# Patient Record
Sex: FEMALE | Race: WHITE | NOT HISPANIC OR LATINO | URBAN - METROPOLITAN AREA
[De-identification: names, ages, dates, MRNs, and addresses within clinical notes are randomized per-mention and may not be internally consistent; named-entity substitution may affect disease eponyms.]

---

## 2023-01-17 ENCOUNTER — TELEMEDICINE (OUTPATIENT)
Dept: PSYCHIATRY | Facility: CLINIC | Age: 63
End: 2023-01-17

## 2023-01-17 DIAGNOSIS — F31.81 BIPOLAR II DISORDER (HCC): Primary | ICD-10-CM

## 2023-01-17 RX ORDER — APIXABAN 5 MG/1
5 TABLET, FILM COATED ORAL 2 TIMES DAILY
COMMUNITY
Start: 2023-01-01

## 2023-01-17 RX ORDER — RISPERIDONE 3 MG/1
3 TABLET ORAL DAILY
COMMUNITY
Start: 2022-12-31 | End: 2023-01-17 | Stop reason: SDUPTHER

## 2023-01-17 RX ORDER — DOXYCYCLINE HYCLATE 20 MG
20 TABLET ORAL DAILY
COMMUNITY
Start: 2023-01-01

## 2023-01-17 RX ORDER — LAMOTRIGINE 200 MG/1
200 TABLET ORAL DAILY
COMMUNITY
Start: 2023-01-01 | End: 2023-01-17 | Stop reason: SDUPTHER

## 2023-01-17 RX ORDER — LEVOTHYROXINE SODIUM 75 UG/1
TABLET ORAL
COMMUNITY
Start: 2023-01-12

## 2023-01-17 RX ORDER — SIMVASTATIN 40 MG
TABLET ORAL
COMMUNITY
Start: 2023-01-16

## 2023-01-17 RX ORDER — LAMOTRIGINE 200 MG/1
200 TABLET ORAL DAILY
Qty: 30 TABLET | Refills: 5 | Status: SHIPPED | OUTPATIENT
Start: 2023-01-17

## 2023-01-17 RX ORDER — RISPERIDONE 3 MG/1
3 TABLET ORAL DAILY
Qty: 30 TABLET | Refills: 5 | Status: SHIPPED | OUTPATIENT
Start: 2023-01-17

## 2023-01-17 NOTE — PSYCH
Virtual Regular Visit    Verification of patient location:    Patient is located in the following state in which I hold an active license [place]      Assessment/Plan:    Problem List Items Addressed This Visit    None  Visit Diagnoses     Bipolar II disorder ([address] )    -  Primary    Relevant Medications    risperiDONE (RisperDAL) 3 mg tablet    lamoTRIgine (LaMICtal) 200 MG tablet          Goals addressed in session: Goal 1          Reason for visit is   Chief Complaint   Patient presents with   • Medication Management        Encounter provider Eliane Ludwig MD    Provider located at [address] [facility]  [address]  [phone]      Recent Visits  No visits were found meeting these conditions  Showing recent visits within past 7 days and meeting all other requirements  Today's Visits  Date Type Provider Dept   01/17/23 Telemedicine Eliane Ludwig, 5 [facility] today's visits and meeting all other requirements  Future Appointments  No visits were found meeting these conditions  Showing future appointments within next 150 days and meeting all other requirements       The patient was identified by name and date of birth  Crispin Donald was informed that this is a telemedicine visit and that the visit is being conducted [employer]  She agrees to proceed     My office door was closed  No one else was in the room  She acknowledged consent and understanding of privacy and security of the video platform  The patient has agreed to participate and understands they can discontinue the visit at any time  Patient is aware this is a billable service  Subjective  Crispin Donald is a 57 y o  female with bipolar do presents for regular f/u      Compliant with meds, denies SE  Endocrinology w/u for hypothyroidism and pre DM - regular blood work  Pt works      HPI   Mood -reports as good and stable; good energy; social and active  Anxiety - denies    Past Medical History:   Diagnosis Date   • COPD (chronic obstructive pulmonary disease) ([facility] )    • Hypothyroidism    • PE (pulmonary thromboembolism) ([facility] )        History reviewed  No pertinent surgical history  Current Outpatient Medications   Medication Sig Dispense Refill   • lamoTRIgine (LaMICtal) 200 MG tablet Take 1 tablet (200 mg total) by mouth daily 30 tablet 5   • risperiDONE (RisperDAL) 3 mg tablet Take 1 tablet (3 mg total) by mouth daily 30 tablet 5   • doxycycline (PERIOSTAT) 20 MG tablet Take 20 mg by mouth daily     • Eliquis 5 MG Take 5 mg by mouth 2 (two) times a day     • simvastatin (ZOCOR) 40 mg tablet      • Unithroid 75 MCG tablet        No current facility-administered medications for this visit  Not on File    Review of Systems   Constitutional: Negative for activity change and appetite change  Endocrine:        Hypothyroidism   Psychiatric/Behavioral: Negative for dysphoric mood, sleep disturbance and suicidal ideas  The patient is not hyperactive  Video Exam    There were no vitals filed for this visit  Physical Exam  Constitutional:       Appearance: Normal appearance  She is normal weight  Neurological:      Mental Status: She is alert  Psychiatric:         Attention and Perception: Attention and perception normal          Mood and Affect: Mood and affect normal          Speech: Speech normal          Behavior: Behavior normal  Behavior is cooperative  Thought Content:  Thought content normal          Judgment: Judgment normal           Visit Time    Visit Start Time: 7 58 am   Visit Stop Time: 8 04 am   Total Visit Duration: 16 minutes

## 2023-07-17 ENCOUNTER — TELEMEDICINE (OUTPATIENT)
Dept: PSYCHIATRY | Facility: CLINIC | Age: 63
End: 2023-07-17
Payer: MEDICARE

## 2023-07-17 DIAGNOSIS — F31.81 BIPOLAR II DISORDER (HCC): Primary | ICD-10-CM

## 2023-07-17 PROCEDURE — 99213 OFFICE O/P EST LOW 20 MIN: CPT | Performed by: PSYCHIATRY & NEUROLOGY

## 2023-07-17 RX ORDER — RISPERIDONE 3 MG/1
3 TABLET ORAL DAILY
Qty: 30 TABLET | Refills: 5 | Status: SHIPPED | OUTPATIENT
Start: 2023-07-17

## 2023-07-17 RX ORDER — LEVOTHYROXINE SODIUM 100 MCG
100 TABLET ORAL DAILY
COMMUNITY
Start: 2023-07-13

## 2023-07-17 RX ORDER — LAMOTRIGINE 200 MG/1
200 TABLET ORAL DAILY
Qty: 30 TABLET | Refills: 5 | Status: SHIPPED | OUTPATIENT
Start: 2023-07-17

## 2023-07-17 NOTE — PSYCH
Virtual Regular Visit    Verification of patient location:    Patient is located at Home in the following state in which I hold an active license [place]      Assessment/Plan:    Problem List Items Addressed This Visit    None      Goals addressed in session: Goal 1          Reason for visit is   Chief Complaint   Patient presents with   • Medication Management        Encounter provider Summer Vergara MD    Provider located at [address] [place] [address],[address]  [address]  [phone]      Recent Visits  No visits were found meeting these conditions. Showing recent visits within past 7 days and meeting all other requirements  Today's Visits  Date Type Provider Dept   07/17/23 Telemedicine Summer Vergara, [address] today's visits and meeting all other requirements  Future Appointments  No visits were found meeting these conditions. Showing future appointments within next 150 days and meeting all other requirements       The patient was identified by name and date of birth. Shai Dasilva was informed that this is a telemedicine visit and that the visit is being conducted [employer]. She agrees to proceed. .  My office door was closed. No one else was in the room. She acknowledged consent and understanding of privacy and security of the video platform. The patient has agreed to participate and understands they can discontinue the visit at any time. Poor audio connection; phone was also used  Patient is aware this is a billable service. Subjective  Shai Dasilva is a 70 y.o. female with bipolar do presents for regular f/u   On lamictal and trispiral; denies SE. Pre DM - PCP monitors BGM  hypothyroidism - not well controlled - meds adjusted  Pt continues to work    HPI   Mood - reports as mostly good and stable; denies mood swings or depression.  Episodes of low energy ( thyroid?), but does ADLs, social, active  Anxiety - denies panic attacks or racing thoughts    Past Medical History:   Diagnosis Date   • COPD (chronic obstructive pulmonary disease) ([address])    • Hypothyroidism    • PE (pulmonary thromboembolism) ([address])        History reviewed. No pertinent surgical history. Current Outpatient Medications   Medication Sig Dispense Refill   • doxycycline (PERIOSTAT) 20 MG tablet Take 20 mg by mouth daily     • Eliquis 5 MG Take 5 mg by mouth 2 (two) times a day     • lamoTRIgine (LaMICtal) 200 MG tablet Take 1 tablet (200 mg total) by mouth daily 30 tablet 5   • risperiDONE (RisperDAL) 3 mg tablet Take 1 tablet (3 mg total) by mouth daily 30 tablet 5   • simvastatin (ZOCOR) 40 mg tablet      • Synthroid 100 MCG tablet Take 100 mcg by mouth daily     • Unithroid 75 MCG tablet        No current facility-administered medications for this visit. Not on File    Review of Systems   Constitutional: Negative for activity change and appetite change. Endocrine:        Hypothyroidism; pre DM   Psychiatric/Behavioral: Negative for dysphoric mood, sleep disturbance and suicidal ideas. The patient is not nervous/anxious. Video Exam    There were no vitals filed for this visit. Physical Exam  Constitutional:       Appearance: Normal appearance. She is normal weight. Neurological:      Mental Status: She is alert. Psychiatric:         Attention and Perception: Attention and perception normal.         Mood and Affect: Mood and affect normal.         Speech: Speech normal.         Behavior: Behavior normal. Behavior is cooperative. Thought Content:  Thought content normal.         Judgment: Judgment normal.          Visit Time    Visit Start Time: 7.58 am   Visit Stop Time: 8.06 am   Total Visit Duration: 15 minutes

## 2024-01-09 ENCOUNTER — TELEMEDICINE (OUTPATIENT)
Dept: PSYCHIATRY | Facility: CLINIC | Age: 64
End: 2024-01-09
Payer: MEDICARE

## 2024-01-09 DIAGNOSIS — F31.81 BIPOLAR II DISORDER (HCC): Primary | ICD-10-CM

## 2024-01-09 PROCEDURE — 99213 OFFICE O/P EST LOW 20 MIN: CPT | Performed by: PSYCHIATRY & NEUROLOGY

## 2024-01-09 RX ORDER — RISPERIDONE 3 MG/1
3 TABLET ORAL DAILY
Qty: 30 TABLET | Refills: 5 | Status: SHIPPED | OUTPATIENT
Start: 2024-01-09

## 2024-01-09 RX ORDER — LAMOTRIGINE 200 MG/1
200 TABLET ORAL DAILY
Qty: 30 TABLET | Refills: 5 | Status: SHIPPED | OUTPATIENT
Start: 2024-01-09

## 2024-01-09 NOTE — PSYCH
Virtual Regular Visit    Verification of patient location:    Patient is located at Home in the following state in which I hold an [place] license [place]      Assessment/Plan:    Problem List Items Addressed This Visit    None      Goals addressed in session: Goal 1          Reason for visit is   Chief Complaint   Patient presents with    Medication Management        Encounter provider Ct Patricio MD    Provider located at [facility]  [address] [id number] [phone]      Recent Visits  No visits were found meeting these conditions.  Showing recent visits within past 7 days and meeting all other requirements  Today's Visits  Date Type Provider Dept   01/09/24 Telemedicine Ct Patricio MD [facility]   Showing today's visits and meeting all other requirements  Future Appointments  No visits were found meeting these conditions.  Showing future appointments within next 150 days and meeting all other requirements       The patient was identified by name and date of birth. Stacy Dockery was informed that this is a telemedicine visit and that the visit is being conducted throughthe Epic Embedded platform. She agrees to proceed..  My office door was closed. No one else was in the room.  She acknowledged consent and understanding of privacy and security of the video platform. The patient has agreed to participate and understands they can discontinue the visit at any time.    Patient is aware this is a billable service.     Subjective  Stacy Dockery is a 63 y.o. female with bipolar do presents for regular f/u  .  Compliant with meds, denies SE  Blood work by PCP in October - WNL; thyroid - WNL, on meds  works      HPI   Mood - reports as good and stable; denies depression or mood swings. Good energy most of the time; social and active at baseline. Denies AH  Past Medical History:   Diagnosis Date    COPD (chronic obstructive  pulmonary disease) ([facility])     Hypothyroidism     PE (pulmonary thromboembolism) ([facility])        History reviewed. No pertinent surgical history.    Current Outpatient Medications   Medication Sig Dispense Refill    lamoTRIgine (LaMICtal) 200 MG tablet Take 1 tablet (200 mg total) by mouth daily 30 tablet 5    risperiDONE (RisperDAL) 3 mg tablet Take 1 tablet (3 mg total) by mouth daily 30 tablet 5    doxycycline (PERIOSTAT) 20 MG tablet Take 20 mg by mouth daily      Eliquis 5 MG Take 5 mg by mouth 2 (two) times a day      simvastatin (ZOCOR) 40 mg tablet       Synthroid 100 MCG tablet Take 100 mcg by mouth daily      Unithroid 75 MCG tablet        No current facility-administered medications for this visit.        Not on File    Review of Systems   Constitutional:  Negative for activity change and appetite change.   Psychiatric/Behavioral:  Negative for dysphoric mood, sleep disturbance and suicidal ideas. The patient is not nervous/anxious.        Video Exam    There were no vitals filed for this visit.    Physical Exam  Constitutional:       Appearance: Normal appearance.   Neurological:      Mental Status: She is alert.   Psychiatric:         Attention and Perception: Attention and perception normal.         Mood and Affect: Mood and affect normal.         Behavior: Behavior normal. Behavior is cooperative.         Thought Content: Thought content normal.         Judgment: Judgment normal.      Comments: Pt is not very talkative at baseline          Visit Time    Visit Start Time: 7.56 am   Visit Stop Time: 8.02 am   Total Visit Duration:  15 minutes    TREATMENT PLAN (Medication Management Only)        [facility]    Name and Date of Birth:  Stacy Dockery 63 y.o. 1960  Date of Treatment Plan: January 9, 2024  Diagnosis/Diagnoses:    1. Bipolar II disorder (HCC)      Strengths/Personal Resources for Self-Care: supportive family, taking medications as  prescribed.  Area/Areas of need (in own words): anxiety, mood swings  1. Long Term Goal: maintain mood stability.  Target Date:6 months - 7/9/2024  Person/Persons responsible for completion of goal: Stacy  2.  Short Term Objective (s) - How will we reach this goal?:   A. Provider new recommended medication/dosage changes and/or continue medication(s): continue current medications as prescribed Lamictal, Risperdal.  B. N/A.  C. N/A.  Target Date:6 months - 7/9/2024  Person/Persons Responsible for Completion of Goal: Stacy  Progress Towards Goals: stable  Treatment Modality: medication management every 6 month  Review due 180 days from date of this plan: 6 months - 7/9/2024  Expected length of service: maintenance  My Physician/PA/NP and I have developed this plan together and I agree to work on the goals and objectives. I understand the treatment goals that were developed for my treatment.

## 2024-06-24 ENCOUNTER — TELEMEDICINE (OUTPATIENT)
Dept: PSYCHIATRY | Facility: CLINIC | Age: 64
End: 2024-06-24
Payer: MEDICARE

## 2024-06-24 DIAGNOSIS — F31.81 BIPOLAR II DISORDER (HCC): ICD-10-CM

## 2024-06-24 PROCEDURE — 99214 OFFICE O/P EST MOD 30 MIN: CPT | Performed by: PSYCHIATRY & NEUROLOGY

## 2024-06-24 RX ORDER — RISPERIDONE 3 MG/1
3 TABLET ORAL DAILY
Qty: 30 TABLET | Refills: 5 | Status: SHIPPED | OUTPATIENT
Start: 2024-06-24

## 2024-06-24 RX ORDER — LAMOTRIGINE 200 MG/1
200 TABLET ORAL DAILY
Qty: 30 TABLET | Refills: 5 | Status: SHIPPED | OUTPATIENT
Start: 2024-06-24

## 2024-06-24 NOTE — PATIENT INSTRUCTIONS
Continue lamictal and risperdal on current doses  F/u with PCP re: vit D deficiency and blood work  Healthy diet and exercises d/w pt  Long term effects of chronic MJ use and risk of amotivational syndrome explained to the pt - recommended to stop

## 2024-06-24 NOTE — PSYCH
Virtual Regular Visit    Verification of patient location:    Patient is located at Home in the following state in which I hold an [place] license [place]      Assessment/Plan:    Problem List Items Addressed This Visit    None      Goals addressed in session: Goal 1          Reason for visit is   Chief Complaint   Patient presents with    Medication Management        Encounter provider Ct Patricio MD      Recent Visits  No visits were found meeting these conditions.  Showing recent visits within past 7 days and meeting all other requirements  Today's Visits  Date Type Provider Dept   06/24/24 Telemedicine Ct Patricio MD [facility]   Showing today's visits and meeting all other requirements  Future Appointments  No visits were found meeting these conditions.  Showing future appointments within next 150 days and meeting all other requirements       The patient was identified by name and date of birth. Stacy Dockery was informed that this is a telemedicine visit and that the visit is being conducted throughthe Epic Embedded platform. She agrees to proceed..  My office door was closed. No one else was in the room.  She acknowledged consent and understanding of privacy and security of the video platform. The patient has agreed to participate and understands they can discontinue the visit at any time.    Patient is aware this is a billable service.     Subjective  Stacy Dockery is a 64 y.o. female with bipolar do presents for regular f/u  .  Compliant with meds, SE weight gain ( unclear; current weight 185 lbs)  Works full time  Hypothyroidism on meds; dx with vit D deficiency several years ago; started supplements a month ago; PCP check up in November  Smokes weed , chronic - claims to decrease to bid  Not physically active; not on healthy diet    HPI   Mood - reports as stable; denies depression or mood swings  Pt c/o low motivation and low energy for several years; does ADLs; social at  baseline  Anxiety - controlled; denies panic attacks or racing thoughts  Sleep - 9-10 hrs  Psychosis - denies AH or delusions  Past Medical History:   Diagnosis Date    COPD (chronic obstructive pulmonary disease) ([facility])     Hypothyroidism     PE (pulmonary thromboembolism) ([facility])        History reviewed. No pertinent surgical history.    Current Outpatient Medications   Medication Sig Dispense Refill    doxycycline (PERIOSTAT) 20 MG tablet Take 20 mg by mouth daily      lamoTRIgine (LaMICtal) 200 MG tablet Take 1 tablet (200 mg total) by mouth daily 30 tablet 5    risperiDONE (RisperDAL) 3 mg tablet Take 1 tablet (3 mg total) by mouth daily 30 tablet 5    simvastatin (ZOCOR) 40 mg tablet       Synthroid 100 MCG tablet Take 100 mcg by mouth daily      Eliquis 5 MG Take 5 mg by mouth 2 (two) times a day      Unithroid 75 MCG tablet        No current facility-administered medications for this visit.        Not on File    Review of Systems   Constitutional:  Negative for activity change and appetite change.   Psychiatric/Behavioral:  Negative for dysphoric mood, sleep disturbance and suicidal ideas. The patient is not nervous/anxious.        Video Exam    There were no vitals filed for this visit.    Physical Exam  Constitutional:       Appearance: Normal appearance.   Neurological:      Mental Status: She is alert.   Psychiatric:         Attention and Perception: Attention and perception normal.         Mood and Affect: Mood normal. Affect is blunt.         Speech: Speech normal.         Behavior: Behavior normal. Behavior is cooperative.         Thought Content: Thought content normal.         Judgment: Judgment normal.          Visit Time    Visit Start Time: 7.58 am   Visit Stop Time: 8.11 am  Total Visit Duration:  25 minutes    TREATMENT PLAN (Medication Management Only)        [facility]    Name and Date of Birth:  Stacy Khannzo 64 y.o. 1960  Date of  Treatment Plan: June 24, 2024  Diagnosis/Diagnoses:  No diagnosis found.  Strengths/Personal Resources for Self-Care: taking medications as prescribed, motivation for treatment.  Area/Areas of need (in own words): mood swings  1. Long Term Goal: maintain mood stability.  Target Date:6 months - 12/24/2024  Person/Persons responsible for completion of goal: Stacy  2.  Short Term Objective (s) - How will we reach this goal?:   A. Provider new recommended medication/dosage changes and/or continue medication(s): continue current medications as prescribed Lamictal, Risperdal.  B. N/A.  C. N/A.  Target Date:6 months - 12/24/2024  Person/Persons Responsible for Completion of Goal: Stacy  Progress Towards Goals: stable  Treatment Modality: medication management every 6 months  Review due 180 days from date of this plan: 6 months - 12/24/2024  Expected length of service: ongoing treatment  My Physician/PA/NP and I have developed this plan together and I agree to work on the goals and objectives. I understand the treatment goals that were developed for my treatment.

## 2024-12-16 ENCOUNTER — TELEPHONE (OUTPATIENT)
Dept: PSYCHIATRY | Facility: CLINIC | Age: 64
End: 2024-12-16

## 2024-12-16 ENCOUNTER — TELEMEDICINE (OUTPATIENT)
Dept: PSYCHIATRY | Facility: CLINIC | Age: 64
End: 2024-12-16
Payer: MEDICARE

## 2024-12-16 DIAGNOSIS — E88.810 METABOLIC SYNDROME: ICD-10-CM

## 2024-12-16 DIAGNOSIS — F31.81 BIPOLAR II DISORDER (HCC): Primary | ICD-10-CM

## 2024-12-16 PROCEDURE — 99214 OFFICE O/P EST MOD 30 MIN: CPT | Performed by: PSYCHIATRY & NEUROLOGY

## 2024-12-16 RX ORDER — RISPERIDONE 3 MG/1
3 TABLET ORAL DAILY
Qty: 30 TABLET | Refills: 5 | Status: SHIPPED | OUTPATIENT
Start: 2024-12-16

## 2024-12-16 RX ORDER — LAMOTRIGINE 200 MG/1
200 TABLET ORAL DAILY
Qty: 30 TABLET | Refills: 5 | Status: SHIPPED | OUTPATIENT
Start: 2024-12-16

## 2024-12-16 NOTE — PSYCH
Virtual Regular Visit    Verification of patient location:    Patient is located at Home in the following state in which I hold an [place] license [place]      Assessment/Plan:  Assessment & Plan  Bipolar II disorder (HCC)    Orders:    lamoTRIgine (LaMICtal) 200 MG tablet; Take 1 tablet (200 mg total) by mouth daily    risperiDONE (RisperDAL) 3 mg tablet; Take 1 tablet (3 mg total) by mouth daily    Basic metabolic panel; Future    Lipid panel; Future       Problem List Items Addressed This Visit    None  Visit Diagnoses         Bipolar II disorder (HCC)    -  Primary            Goals addressed in session: Goal 1     Depression Follow-up Plan Completed: n/a    Reason for visit is   Chief Complaint   Patient presents with    Medication Management        Encounter provider Ct Patricio MD      Recent Visits  No visits were found meeting these conditions.  Showing recent visits within past 7 days and meeting all other requirements  Today's Visits  Date Type Provider Dept   12/16/24 Telemedicine Ct Patricio MD [facility]   Showing today's visits and meeting all other requirements  Future Appointments  No visits were found meeting these conditions.  Showing future appointments within next 150 days and meeting all other requirements       The patient was identified by name and date of birth. Stacy Dockery was informed that this is a telemedicine visit and that the visit is being conducted throughthe Epic Embedded platform. She agrees to proceed..  My office door was closed. No one else was in the room.  She acknowledged consent and understanding of privacy and security of the video platform. The patient has agreed to participate and understands they can discontinue the visit at any time.    Patient is aware this is a billable service.     Subjective  Stacy Dockery is a 64 y.o. female with bipolar do presents for regular f/u  .  Compliant with meds, denies SE  I reviewed the chart  No recent blood work or  "check up; on meds for hypothyroidism; stable  Weight 185 lbs, stable - pt is not on a diet ; not physically active      HPI   Mood - reports as \" good\"; denies depression or mood swings. Pt functions at baseline; social; does ADLs  Psychosis - denies AH or delusions  Past Medical History:   Diagnosis Date    COPD (chronic obstructive pulmonary disease) ([facility])     Hypothyroidism     PE (pulmonary thromboembolism) ([facility])        History reviewed. No pertinent surgical history.    Current Outpatient Medications   Medication Sig Dispense Refill    doxycycline (PERIOSTAT) 20 MG tablet Take 20 mg by mouth daily      Eliquis 5 MG Take 5 mg by mouth 2 (two) times a day      lamoTRIgine (LaMICtal) 200 MG tablet Take 1 tablet (200 mg total) by mouth daily 30 tablet 5    risperiDONE (RisperDAL) 3 mg tablet Take 1 tablet (3 mg total) by mouth daily 30 tablet 5    simvastatin (ZOCOR) 40 mg tablet       Synthroid 100 MCG tablet Take 100 mcg by mouth daily      Unithroid 75 MCG tablet        No current facility-administered medications for this visit.        Not on File    Review of Systems   Constitutional:  Negative for activity change and appetite change.   Endocrine:        Hypothyroidism , on meds   Psychiatric/Behavioral:  Negative for dysphoric mood, sleep disturbance and suicidal ideas. The patient is not nervous/anxious.        Video Exam    There were no vitals filed for this visit.    Physical Exam  Constitutional:       Appearance: Normal appearance. She is normal weight.   Neurological:      Mental Status: She is alert.   Psychiatric:         Attention and Perception: Attention and perception normal.         Mood and Affect: Mood and affect normal.         Speech: Speech normal.         Behavior: Behavior normal. Behavior is cooperative.         Thought Content: Thought content normal.         Judgment: Judgment normal.          Visit Time  Face to face  Visit Start Time: 8.00 am   Visit Stop Time: 8.07 am   Total Visit " Duration:  20 minutes total spent in patient care

## 2024-12-16 NOTE — BH TREATMENT PLAN
TREATMENT PLAN (Medication Management Only)        [facility]    Name and Date of Birth:  Stacy Dockery 64 y.o. 1960  Date of Treatment Plan: December 16, 2024  Diagnosis/Diagnoses:    1. Bipolar II disorder (HCC)      Strengths/Personal Resources for Self-Care: taking medications as prescribed, motivation for treatment.  Area/Areas of need (in own words): mood swings  1. Long Term Goal: maintain mood stability.  Target Date:6 months - 6/16/2025  Person/Persons responsible for completion of goal: Stacy  2.  Short Term Objective (s) - How will we reach this goal?:   A. Provider new recommended medication/dosage changes and/or continue medication(s): continue current medications as prescribed Lamictal, Risperdal.  B. N/A.  C. N/A.  Target Date:6 months - 6/16/2025  Person/Persons Responsible for Completion of Goal: Stacy  Progress Towards Goals: stable  Treatment Modality: medication management with psychotherapy every 6 months  Review due 180 days from date of this plan: 6 months - 6/16/2025  Expected length of service: ongoing treatment  My Physician/PA/NP and I have developed this plan together and I agree to work on the goals and objectives. I understand the treatment goals that were developed for my treatment.

## 2024-12-16 NOTE — TELEPHONE ENCOUNTER
Writer called and scheduled 6 month follow up with Dr. Patricio, informed client of yearly forms, and mailed out labwork orders from 12/16/24 to address on file (confirmed with client).

## 2025-02-28 DIAGNOSIS — Z79.899 ENCOUNTER FOR LONG-TERM (CURRENT) USE OF MEDICATIONS: Primary | ICD-10-CM

## 2025-06-12 ENCOUNTER — TELEPHONE (OUTPATIENT)
Dept: PSYCHIATRY | Facility: CLINIC | Age: 65
End: 2025-06-12

## 2025-06-12 NOTE — TELEPHONE ENCOUNTER
Writer called and left voicemail for client informing her that all of her yearly paperwork is due for her Monday appointment with Dr. Patricio.     Writer suggested trying to log in and sign forms today or tomorrow to make sure there are no issues as if client has not logged in since mid-May she will have to reset [employer] password.     Client given [employer] helpdesk and our number to call back with any issues.

## 2025-06-16 ENCOUNTER — TELEMEDICINE (OUTPATIENT)
Dept: PSYCHIATRY | Facility: CLINIC | Age: 65
End: 2025-06-16
Payer: MEDICARE

## 2025-06-16 ENCOUNTER — TELEPHONE (OUTPATIENT)
Dept: PSYCHIATRY | Facility: CLINIC | Age: 65
End: 2025-06-16

## 2025-06-16 DIAGNOSIS — F31.81 BIPOLAR II DISORDER (HCC): ICD-10-CM

## 2025-06-16 PROCEDURE — 99213 OFFICE O/P EST LOW 20 MIN: CPT | Performed by: PSYCHIATRY & NEUROLOGY

## 2025-06-16 RX ORDER — LAMOTRIGINE 200 MG/1
200 TABLET ORAL DAILY
Qty: 30 TABLET | Refills: 5 | Status: SHIPPED | OUTPATIENT
Start: 2025-06-16

## 2025-06-16 RX ORDER — RISPERIDONE 3 MG/1
3 TABLET ORAL DAILY
Qty: 30 TABLET | Refills: 5 | Status: SHIPPED | OUTPATIENT
Start: 2025-06-16

## 2025-06-16 NOTE — PSYCH
MEDICATION MANAGEMENT NOTE    Name: Stacy Dockery      : 1960      MRN: [id number]  Encounter Provider: Ct Patricio MD  Encounter Date: 2025   Encounter department: [facility] OUTPATIENT    Insurance: Payor: MEDICARE / Plan: MEDICARE A AND B / Product Type: Medicare A & B Fee for Service /      Reason for Visit:   Chief Complaint   Patient presents with    Medication Management   :  Assessment & Plan  Bipolar II disorder (HCC)    Orders:    risperiDONE (RisperDAL) 3 mg tablet; Take 1 tablet (3 mg total) by mouth daily    lamoTRIgine (LaMICtal) 200 MG tablet; Take 1 tablet (200 mg total) by mouth daily        Treatment Recommendations:    Educated about diagnosis and treatment modalities. Verbalizes understanding and agreement with the treatment plan.  Discussed self monitoring of symptoms, and symptom monitoring tools.  Discussed medications and if treatment adjustment was needed or desired.  I am scheduling this patient out for greater than 3 months: Yes - Patient's stability of symptoms warrant this length of time or no significant changes to treatment plan    Medications Risks/Benefits:      Risks, Benefits And Possible Side Effects Of Medications:    Risks, benefits, and possible side effects of medications explained to Stacy and she (or legal representative) verbalizes understanding and agreement for treatment.    Controlled Medication Discussion:     Not applicable      History of Present Illness       Pt presents for regular f/u .  Compliant with meds, denies SE  Pt reports blood work done by PCP - WNL; I have no results for review.  Pt lost 15 lbs on zepbound.   She continues to work, full time  Mood - reports as mostly good and stable; denies depression or yan. Still low energy, but is able to function at her baseline. Social, does ADLs,  Anxiety - denies panic attacks or racing thoughts  Psychosis - denies AH or delusions  Sleep, appetite -  normal  Review Of Systems: A review of systems is obtained and is negative except for the pertinent positives listed in HPI/Subjective above.      Current Rating Scores:         Areas of Improvement: reviewed in HPI/Subjective Section      Past Medical History[1]  Past Surgical History[2]  Allergies: Allergies[3]    Current Outpatient Medications   Medication Instructions    doxycycline (PERIOSTAT) 20 mg, Oral, Daily    Eliquis 5 mg, Oral, 2 times daily    lamoTRIgine (LAMICTAL) 200 mg, Oral, Daily    risperiDONE (RISPERDAL) 3 mg, Oral, Daily    simvastatin (ZOCOR) 40 mg tablet No dose, route, or frequency recorded.    Synthroid 100 mcg, Daily    Unithroid 75 MCG tablet No dose, route, or frequency recorded.        Substance Abuse History:    Tobacco, Alcohol and Drug Use History     Tobacco Use    Smoking status: Not on file    Smokeless tobacco: Not on file   Substance Use Topics    Alcohol use: Not on file    Drug use: Not on file          Social History:    Social History     Socioeconomic History    Marital status: /Civil Union     Spouse name: Not on file    Number of children: Not on file    Years of education: Not on file    Highest education level: Not on file   Occupational History    Not on file   Other Topics Concern    Not on file   Social History Narrative    Not on file        Family Psychiatric History:     Family History[4]    Medical History Reviewed by provider this encounter:  Meds  Problems  Med Hx  Surg Hx  Fam Hx          Objective   There were no vitals taken for this visit.     Mental Status Evaluation:    Appearance age appropriate, casually dressed   Behavior cooperative, calm   Speech normal rate, normal volume   Mood euthymic   Affect constricted   Thought Processes organized, goal directed   Thought Content no overt delusions   Perceptual Disturbances: no auditory hallucinations, no visual hallucinations   Abnormal Thoughts  Risk Potential Suicidal ideation - None  Homicidal  ideation - None  Potential for aggression - No   Orientation grossly oriented   Memory recent and remote memory grossly intact   Consciousness alert and awake   Attention Span Concentration Span attention span and concentration are age appropriate   Intellect appears to be of average intelligence   Insight intact   Judgement intact   Muscle Strength and  Gait unable to assess today due to virtual visit   Motor activity unable to assess today due to virtual visit   Language no difficulty naming common objects   Fund of Knowledge adequate knowledge of current events       Laboratory Results: I have personally reviewed all pertinent laboratory/tests results        Suicide/Homicide Risk Assessment:    Risk of Harm to Self:  Based on today's assessment, Stacy presents the following risk of harm to self: none    Risk of Harm to Others:  Based on today's assessment, Stacy presents the following risk of harm to others: none    The following interventions are recommended: Continue medication management.    Psychotherapy Provided:     Individual psychotherapy provided: No    Treatment Plan:    Completed and signed during the session: Yes - Treatment Plan done but not signed at time of office visit due to: Plan reviewed by video and verbal consent given due to virtual visit. Treatment Plan sent to patient via [employer] for signature.    Goals: Progress towards Treatment Plan goals - stable.    Depression Follow-up Plan Completed: Not applicable    Note Share:        Administrative Statements   Administrative Statements   Encounter provider Ct Patricio MD    The Patient is located at Home and in the following state in which I hold an active license [place].    The patient was identified by name and date of birth. Satcy Nahed was informed that this is a telemedicine visit and that the visit is being conducted through the Epic Embedded platform. She agrees to proceed..  My office door was closed. No one else was in the room.   She acknowledged consent and understanding of privacy and security of the video platform. The patient has agreed to participate and understands they can discontinue the visit at any time.    I have spent a total time of 20 minutes in caring for this patient on the day of the visit/encounter including Instructions for management, Reviewing/placing orders in the medical record (including tests, medications, and/or procedures), and Obtaining or reviewing history  , not including the time spent for establishing the audio/video connection.    Visit Time  Face to face  Visit Start Time: 8.00 am  Visit Stop Time: 8.09 am   Total Visit Duration: 20 minutes total spent in patient care        Ct Patricio MD 06/16/25       [1]   Past Medical History:  Diagnosis Date    COPD (chronic obstructive pulmonary disease) (HCC)     Hypothyroidism     PE (pulmonary thromboembolism) (HCC)    [2] No past surgical history on file.  [3] Not on File  [4] No family history on file.     Inadequate energy intake.../Loss of subcutaneous fat.../Loss of muscle...

## 2025-06-16 NOTE — BH TREATMENT PLAN
TREATMENT PLAN (Medication Management Only)        [facility]    Name and Date of Birth:  Stacy Dockery 65 y.o. 1960  MRN: [id number]  Date of Treatment Plan: June 16, 2025  Diagnosis/Diagnoses:    1. Bipolar II disorder (HCC)      Strengths/Personal Resources for Self-Care: taking medications as prescribed, motivation for treatment.  Area/Areas of need (in own words): mood instability  1. Long Term Goal:   maintain mood stability.  Target Date:6 months - 12/16/2025  Person/Persons responsible for completion of goal: Stacy  2.  Short Term Objective (s) - How will we reach this goal?:   A.  Provider new recommended medication/dosage changes and/or continue medication(s): continue current medications as prescribed Lamictal and Risperdal.  B.  N/A.  C.  N/A.  Target Date:6 months - 12/16/2025  Person/Persons Responsible for Completion of Goal: Stacy  Progress Towards Goals: stable  Treatment Modality: medication management every 6 months  Review due 180 days from date of this plan: 6 months - 12/16/2025  Expected length of service: ongoing treatment unless revised  My Physician/PA/NP and I have developed this plan together and I agree to work on the goals and objectives. I understand the treatment goals that were developed for my treatment.   Electronic Signatures: on file (unless signed below)    Ct Patricio MD 06/16/25